# Patient Record
Sex: MALE | Race: OTHER | Employment: OTHER | URBAN - METROPOLITAN AREA
[De-identification: names, ages, dates, MRNs, and addresses within clinical notes are randomized per-mention and may not be internally consistent; named-entity substitution may affect disease eponyms.]

---

## 2021-09-20 ENCOUNTER — APPOINTMENT (OUTPATIENT)
Dept: GENERAL RADIOLOGY | Facility: HOSPITAL | Age: 71
End: 2021-09-20
Payer: MEDICARE

## 2021-09-20 ENCOUNTER — HOSPITAL ENCOUNTER (OUTPATIENT)
Facility: HOSPITAL | Age: 71
Setting detail: OBSERVATION
Discharge: HOME HEALTH | End: 2021-09-22
Attending: EMERGENCY MEDICINE | Admitting: HOSPITALIST
Payer: MEDICARE

## 2021-09-20 DIAGNOSIS — J96.01 ACUTE RESPIRATORY FAILURE WITH HYPOXIA (HCC): Primary | ICD-10-CM

## 2021-09-20 DIAGNOSIS — R50.9 FEBRILE ILLNESS: ICD-10-CM

## 2021-09-20 DIAGNOSIS — A41.9 SEPSIS WITHOUT ACUTE ORGAN DYSFUNCTION, DUE TO UNSPECIFIED ORGANISM (HCC): ICD-10-CM

## 2021-09-20 LAB
BASOPHILS # BLD AUTO: 0.01 X10(3) UL (ref 0–0.2)
BASOPHILS NFR BLD AUTO: 0.2 %
DEPRECATED RDW RBC AUTO: 49.6 FL (ref 35.1–46.3)
EOSINOPHIL # BLD AUTO: 0.04 X10(3) UL (ref 0–0.7)
EOSINOPHIL NFR BLD AUTO: 0.6 %
ERYTHROCYTE [DISTWIDTH] IN BLOOD BY AUTOMATED COUNT: 14.4 % (ref 11–15)
HCT VFR BLD AUTO: 33.7 %
HGB BLD-MCNC: 10.4 G/DL
IMM GRANULOCYTES # BLD AUTO: 0.06 X10(3) UL (ref 0–1)
IMM GRANULOCYTES NFR BLD: 0.9 %
LYMPHOCYTES # BLD AUTO: 1.18 X10(3) UL (ref 1–4)
LYMPHOCYTES NFR BLD AUTO: 17.8 %
MCH RBC QN AUTO: 29.3 PG (ref 26–34)
MCHC RBC AUTO-ENTMCNC: 30.9 G/DL (ref 31–37)
MCV RBC AUTO: 94.9 FL
MONOCYTES # BLD AUTO: 0.31 X10(3) UL (ref 0.1–1)
MONOCYTES NFR BLD AUTO: 4.7 %
NEUTROPHILS # BLD AUTO: 5.02 X10 (3) UL (ref 1.5–7.7)
NEUTROPHILS # BLD AUTO: 5.02 X10(3) UL (ref 1.5–7.7)
NEUTROPHILS NFR BLD AUTO: 75.8 %
PLATELET # BLD AUTO: 374 10(3)UL (ref 150–450)
RBC # BLD AUTO: 3.55 X10(6)UL
WBC # BLD AUTO: 6.6 X10(3) UL (ref 4–11)

## 2021-09-20 PROCEDURE — 71045 X-RAY EXAM CHEST 1 VIEW: CPT | Performed by: EMERGENCY MEDICINE

## 2021-09-20 RX ORDER — TAMSULOSIN HYDROCHLORIDE 0.4 MG/1
0.4 CAPSULE ORAL DAILY
COMMUNITY

## 2021-09-20 RX ORDER — CIPROFLOXACIN 500 MG/1
500 TABLET, FILM COATED ORAL 2 TIMES DAILY
Status: ON HOLD | COMMUNITY
End: 2021-09-22

## 2021-09-20 RX ORDER — ONDANSETRON 4 MG/1
4 TABLET, ORALLY DISINTEGRATING ORAL EVERY 6 HOURS PRN
COMMUNITY
End: 2021-09-22

## 2021-09-20 RX ORDER — CELECOXIB 200 MG/1
200 CAPSULE ORAL DAILY
COMMUNITY
End: 2021-09-22

## 2021-09-20 RX ORDER — ASPIRIN 81 MG/1
TABLET, CHEWABLE ORAL 2 TIMES DAILY
COMMUNITY
End: 2021-09-22

## 2021-09-20 RX ORDER — DOCUSATE SODIUM 100 MG/1
100 CAPSULE, LIQUID FILLED ORAL 2 TIMES DAILY PRN
COMMUNITY

## 2021-09-20 RX ORDER — IPRATROPIUM BROMIDE AND ALBUTEROL SULFATE 2.5; .5 MG/3ML; MG/3ML
3 SOLUTION RESPIRATORY (INHALATION) ONCE
Status: COMPLETED | OUTPATIENT
Start: 2021-09-20 | End: 2021-09-21

## 2021-09-21 ENCOUNTER — APPOINTMENT (OUTPATIENT)
Dept: ULTRASOUND IMAGING | Facility: HOSPITAL | Age: 71
End: 2021-09-21
Attending: HOSPITALIST
Payer: MEDICARE

## 2021-09-21 ENCOUNTER — APPOINTMENT (OUTPATIENT)
Dept: CT IMAGING | Facility: HOSPITAL | Age: 71
End: 2021-09-21
Attending: EMERGENCY MEDICINE
Payer: MEDICARE

## 2021-09-21 ENCOUNTER — APPOINTMENT (OUTPATIENT)
Dept: CV DIAGNOSTICS | Facility: HOSPITAL | Age: 71
End: 2021-09-21
Attending: HOSPITALIST
Payer: MEDICARE

## 2021-09-21 PROBLEM — J96.01 ACUTE RESPIRATORY FAILURE WITH HYPOXIA (HCC): Status: ACTIVE | Noted: 2021-09-21

## 2021-09-21 PROBLEM — A41.9 SEPSIS WITHOUT ACUTE ORGAN DYSFUNCTION, DUE TO UNSPECIFIED ORGANISM (HCC): Status: ACTIVE | Noted: 2021-09-21

## 2021-09-21 PROBLEM — R50.9 FEBRILE ILLNESS: Status: ACTIVE | Noted: 2021-09-21

## 2021-09-21 LAB
ADENOVIRUS PCR:: NOT DETECTED
ANION GAP SERPL CALC-SCNC: 9 MMOL/L (ref 0–18)
B PARAPERT DNA SPEC QL NAA+PROBE: NOT DETECTED
B PERT DNA SPEC QL NAA+PROBE: NOT DETECTED
BILIRUB UR QL: NEGATIVE
BUN BLD-MCNC: 11 MG/DL (ref 7–18)
BUN/CREAT SERPL: 8.6 (ref 10–20)
C DIFF TOX B STL QL: NEGATIVE
C PNEUM DNA SPEC QL NAA+PROBE: NOT DETECTED
CALCIUM BLD-MCNC: 8 MG/DL (ref 8.5–10.1)
CHLORIDE SERPL-SCNC: 107 MMOL/L (ref 98–112)
CLARITY UR: CLEAR
CO2 SERPL-SCNC: 23 MMOL/L (ref 21–32)
COLOR UR: YELLOW
CORONAVIRUS 229E PCR:: NOT DETECTED
CORONAVIRUS HKU1 PCR:: NOT DETECTED
CORONAVIRUS NL63 PCR:: NOT DETECTED
CORONAVIRUS OC43 PCR:: NOT DETECTED
CREAT BLD-MCNC: 1.28 MG/DL
FLUAV RNA SPEC QL NAA+PROBE: NOT DETECTED
FLUBV RNA SPEC QL NAA+PROBE: NOT DETECTED
GLUCOSE BLD-MCNC: 87 MG/DL (ref 70–99)
GLUCOSE UR-MCNC: NEGATIVE MG/DL
KETONES UR-MCNC: NEGATIVE MG/DL
LACTATE SERPL-SCNC: 1.9 MMOL/L (ref 0.4–2)
LACTATE SERPL-SCNC: 2.4 MMOL/L (ref 0.4–2)
METAPNEUMOVIRUS PCR:: NOT DETECTED
MYCOPLASMA PNEUMONIA PCR:: NOT DETECTED
NITRITE UR QL STRIP.AUTO: NEGATIVE
NT-PROBNP SERPL-MCNC: 470 PG/ML (ref ?–125)
OSMOLALITY SERPL CALC.SUM OF ELEC: 287 MOSM/KG (ref 275–295)
PARAINFLUENZA 1 PCR:: NOT DETECTED
PARAINFLUENZA 2 PCR:: NOT DETECTED
PARAINFLUENZA 3 PCR:: NOT DETECTED
PARAINFLUENZA 4 PCR:: NOT DETECTED
PH UR: 7 [PH] (ref 5–8)
POTASSIUM SERPL-SCNC: 4.8 MMOL/L (ref 3.5–5.1)
PROCALCITONIN SERPL-MCNC: 0.62 NG/ML (ref ?–0.16)
PROT UR-MCNC: NEGATIVE MG/DL
RHINOVIRUS/ENTERO PCR:: NOT DETECTED
RSV RNA SPEC QL NAA+PROBE: NOT DETECTED
SARS-COV-2 RNA NPH QL NAA+NON-PROBE: NOT DETECTED
SARS-COV-2 RNA RESP QL NAA+PROBE: NOT DETECTED
SODIUM SERPL-SCNC: 139 MMOL/L (ref 136–145)
SP GR UR STRIP: >1.03 (ref 1–1.03)
TROPONIN I SERPL-MCNC: <0.045 NG/ML (ref ?–0.04)
UROBILINOGEN UR STRIP-ACNC: <2

## 2021-09-21 PROCEDURE — 71260 CT THORAX DX C+: CPT | Performed by: EMERGENCY MEDICINE

## 2021-09-21 PROCEDURE — 93306 TTE W/DOPPLER COMPLETE: CPT | Performed by: HOSPITALIST

## 2021-09-21 PROCEDURE — 93970 EXTREMITY STUDY: CPT | Performed by: HOSPITALIST

## 2021-09-21 RX ORDER — ACETAMINOPHEN 500 MG
1000 TABLET ORAL ONCE
Status: COMPLETED | OUTPATIENT
Start: 2021-09-21 | End: 2021-09-21

## 2021-09-21 RX ORDER — ACETAMINOPHEN 325 MG/1
650 TABLET ORAL EVERY 6 HOURS PRN
Status: DISCONTINUED | OUTPATIENT
Start: 2021-09-21 | End: 2021-09-22

## 2021-09-21 RX ORDER — HYDROCODONE BITARTRATE AND ACETAMINOPHEN 5; 325 MG/1; MG/1
1 TABLET ORAL EVERY 6 HOURS PRN
COMMUNITY
End: 2021-09-22

## 2021-09-21 RX ORDER — VANCOMYCIN HYDROCHLORIDE
15 EVERY 24 HOURS
Status: DISCONTINUED | OUTPATIENT
Start: 2021-09-22 | End: 2021-09-22

## 2021-09-21 RX ORDER — VANCOMYCIN 2 GRAM/500 ML IN 0.9 % SODIUM CHLORIDE INTRAVENOUS
25 ONCE
Status: COMPLETED | OUTPATIENT
Start: 2021-09-21 | End: 2021-09-21

## 2021-09-21 RX ORDER — ENOXAPARIN SODIUM 100 MG/ML
1 INJECTION SUBCUTANEOUS 2 TIMES DAILY
Status: DISCONTINUED | OUTPATIENT
Start: 2021-09-21 | End: 2021-09-22

## 2021-09-21 RX ORDER — ACETAMINOPHEN 325 MG/1
650 TABLET ORAL EVERY 6 HOURS PRN
Status: DISCONTINUED | OUTPATIENT
Start: 2021-09-21 | End: 2021-09-21

## 2021-09-21 RX ORDER — ACETAMINOPHEN 500 MG
325 TABLET ORAL EVERY 6 HOURS PRN
COMMUNITY

## 2021-09-21 RX ORDER — MORPHINE SULFATE 2 MG/ML
2 INJECTION, SOLUTION INTRAMUSCULAR; INTRAVENOUS EVERY 6 HOURS PRN
Status: DISCONTINUED | OUTPATIENT
Start: 2021-09-21 | End: 2021-09-22

## 2021-09-21 RX ORDER — ASPIRIN 81 MG/1
81 TABLET, CHEWABLE ORAL 2 TIMES DAILY
Status: DISCONTINUED | OUTPATIENT
Start: 2021-09-21 | End: 2021-09-22

## 2021-09-21 RX ORDER — ONDANSETRON 2 MG/ML
4 INJECTION INTRAMUSCULAR; INTRAVENOUS EVERY 6 HOURS PRN
Status: DISCONTINUED | OUTPATIENT
Start: 2021-09-21 | End: 2021-09-22

## 2021-09-21 RX ORDER — TAMSULOSIN HYDROCHLORIDE 0.4 MG/1
0.4 CAPSULE ORAL DAILY
Status: DISCONTINUED | OUTPATIENT
Start: 2021-09-21 | End: 2021-09-22

## 2021-09-21 NOTE — ED QUICK NOTES
Spoke with receiving RN, Eugenie Polanco, about pts oxygenation. Pt is now on RA, saturation 96-97%.

## 2021-09-21 NOTE — ED PROVIDER NOTES
Patient Seen in: [facility] AND [facility] Emergency Department      History   Patient presents with:  Difficulty Breathing    Stated Complaint: RICO    Subjective:   HPI    52-year-old male approximately 1 week out from left total knee replacement on aspirin who Normal range of motion. Neck supple. No obvious JVD or stridor  Cardiovascular: Tachycardia, regular rhythm and intact and equal distal pulses. Pulmonary/Chest:  moderate respiratory distress and tachypnea.   Diminished expiratory greater than inspirato CBC W/ DIFFERENTIAL - Abnormal; Notable for the following components:    RBC 3.55 (*)     HGB 10.4 (*)     HCT 33.7 (*)     MCHC 30.9 (*)     RDW-SD 49.6 (*)     All other components within normal limits   TROPONIN I - Normal   RAPID SARS-COV-2 BY PCR - acute pulmonary process. Partially visualized gas distended loops of bowel in the upper abdomen. The results were faxed/finalized only at 0124 ET. If you would like to discuss this case directly please call [phone] (extension [phone]).   If you can' including time spent performing procedures.           Admission disposition: 9/21/2021  3:39 AM                                  Disposition and Plan     Clinical Impression:  Acute respiratory failure with hypoxia (HCC)  (primary encounter diagnosis)  Febr

## 2021-09-21 NOTE — ED QUICK NOTES
Orders for admission, patient is aware of plan and ready to go upstairs. Any questions, please call ED JAMES Ricardo  at extension [phone]. Type of COVID test sent: Rapid (-)  COVID Suspicion level:  Mod    Titratable drug(s) infusing: N/A  Rate: N/A    LOC at

## 2021-09-21 NOTE — PLAN OF CARE
Pt arrived to unit on RA, O2 saturation 98% on RA. Vital signs stable, afebrile, no complaints of pain. Received IV Zosyn and IV vancomycin in ED. Med rec complete, MD notified for orders.     Problem: PAIN - ADULT  Goal: Verbalizes/displays adequate comfor changes in respiratory status  - Assess for changes in mentation and behavior  - Position to facilitate oxygenation and minimize respiratory effort  - Oxygen supplementation based on oxygen saturation or ABGs  - Provide Smoking Cessation handout, if applic platelets)  INTERVENTIONS:  - Avoid intramuscular injections, enemas and rectal medication administration  - Ensure safe mobilization of patient  - Hold pressure on venipuncture sites to achieve adequate hemostasis  - Assess for signs and symptoms of inter

## 2021-09-21 NOTE — ED INITIAL ASSESSMENT (HPI)
Pt here with [facility]. Pt here with complaints of SOB. Pt went to lay in bed and began feeling SOB and called EMS. Pt 74% on RA and presented to ER with NRB at 92%. Pt had knee surgery about 1 week ago.

## 2021-09-21 NOTE — ED QUICK NOTES
Pt to CT and returned off Bipap. Pt reports no difficulty in breathing.  Verbal order received to place patient on 6L NC.

## 2021-09-21 NOTE — PAYOR COMM NOTE
--------------  ADMISSION REVIEW     Payor: [employer] MEDICARE ADV PPO  Subscriber #:  [id number]  Authorization Number: [id number]       ED Provider Notes        History   Patient presents with:  Difficulty Breathing    Stated Complaint: RICO    Subjective:   H obvious crackles  Abdominal: Soft. There is no tenderness. There is no guarding. Musculoskeletal: Moderate postoperative changes about the left lower leg and knee area. No obvious signs of infection or redness.   Dorsalis pedis pulses intact bilaterally FLU EXPAND PANEL + COVID-19 - Normal    Narrative:    CBC WITH DIFFERENTIAL WITH PLATELET   LACTIC ACID 3 HR POST POSITIVE   RAINBOW DRAW BLUE   RAINBOW DRAW GOLD   BLOOD CULTURE   BLOOD CULTURE     EKG    Rate, intervals and axes as noted on EKG Report. was in waiting room for 8 hours for urinary retention and got cervantes. Cervantes was removed after 3 days by urology but then reinserted next day for retention.  He showed up with fever to [facility] ER and was sent put with cipro po     Pmh: no medical problems  FH: require TBD          9/21 ID    # Acute fever with rigors, R/o bacteremia ? reactive to PE ?DVT               -FU blood cx  # Acute pulmonary emboli with LLE DVT  # Recent L TKA 9/8/21  # Urinary retention     PLAN:  -  Continue on vancomycin and zosyn pend Jade Dickey RCP      ipratropium-albuterol (DUONEB) nebulizer solution 3 mL     Date Action Dose Route User    9/21/2021 0000 Given  Nebulization Belen Dickey RCP      nitroGLYCERIN (NITRO-BID 2 % TD OINT) 2 % ointment 1 inch     Date Action Dose Route User

## 2021-09-21 NOTE — CONSULTS
[facility]    Cindi Garcia Patient Status:  Inpatient    1950 MRN [id number]   Location [facility]5W Attending Paige Villavicencio MD   Hosp Day # 0 PCP Harriett Fitzpatrick       Reason for [facility] BID    Review of Systems:  CONSTITUTIONAL:  No weight loss, +fever  HEENT:  Eyes:  No visual loss, blurred vision, double vision or yellow sclerae. Ears, Nose, Throat:  No hearing loss, sneezing, congestion, runny nose or sore throat.   SKIN:  No rash or it shortness of breath.  Patient was seen in [facility] ED on 9/13 with difficulty urinating s/p cervantes placement, where it was removed on 9/16, but then replaced the next day and came to [facility] on 9/19 for fever up to 101.3, UA with 44 wbcs, and sent on ciprofl

## 2021-09-21 NOTE — PROGRESS NOTES
[facility] Dosing Service    Initial Pharmacokinetic Consult for Vancomycin AUC Dosing    Lavon Sarabia is a 72year old patient who is being treated for fever of unknown origin.   Pharmacy has been asked to dose vancomycin by Brianna Pacheco PA-C.

## 2021-09-21 NOTE — PLAN OF CARE
Problem: Patient Centered Care  Goal: Patient preferences are identified and integrated in the patient's plan of care  Description: Interventions:  - What would you like us to know as we care for you?  Recent surgery - left knee replacement  - Provide lori Progressing     Problem: RISK FOR INFECTION - ADULT  Goal: Absence of fever/infection during anticipated neutropenic period  Description: INTERVENTIONS  - Monitor WBC  - Administer growth factors as ordered  - Implement neutropenic guidelines  Outcome: Pro drain sites and surrounding tissue  - Implement wound care per orders  - Initiate isolation precautions as appropriate  - Initiate Pressure Ulcer prevention bundle as indicated  Outcome: Progressing     Vital signs are stable. Denies pain or discomfort.  On

## 2021-09-21 NOTE — CM/SW NOTE
Pt is home w/spouse. [facility] has one [facility] left. Pt does not want further home health. Pt stated he will f/u with [facility]. Eliquis coupon provided to RN.     KARAN/SW to remain available for support and/or dis

## 2021-09-22 VITALS
HEIGHT: 67 IN | RESPIRATION RATE: 20 BRPM | TEMPERATURE: 98 F | SYSTOLIC BLOOD PRESSURE: 134 MMHG | OXYGEN SATURATION: 98 % | BODY MASS INDEX: 27.49 KG/M2 | HEART RATE: 89 BPM | WEIGHT: 175.19 LBS | DIASTOLIC BLOOD PRESSURE: 66 MMHG

## 2021-09-22 PROCEDURE — 99223 1ST HOSP IP/OBS HIGH 75: CPT | Performed by: INTERNAL MEDICINE

## 2021-09-22 RX ORDER — VANCOMYCIN HYDROCHLORIDE 125 MG/1
125 CAPSULE ORAL DAILY
Status: DISCONTINUED | OUTPATIENT
Start: 2021-09-22 | End: 2021-09-22

## 2021-09-22 RX ORDER — CIPROFLOXACIN 500 MG/1
500 TABLET, FILM COATED ORAL 2 TIMES DAILY
Qty: 6 TABLET | Refills: 0 | Status: SHIPPED | OUTPATIENT
Start: 2021-09-22 | End: 2021-09-25

## 2021-09-22 RX ORDER — VANCOMYCIN HYDROCHLORIDE 125 MG/1
125 CAPSULE ORAL DAILY
Qty: 10 CAPSULE | Refills: 0 | Status: SHIPPED | OUTPATIENT
Start: 2021-09-23 | End: 2021-10-03

## 2021-09-22 NOTE — PLAN OF CARE
Continues on IV Zosyn and vancomycin, awaiting blood cultures and 2D echo results. Continues on Lovenox for right PE and L leg DVT. Tylenol given once for pain. Espinoza in place and draining. Stool negative for C diff, had 1 BM this shift.  Fall risk precauti evaluate response  - Consider cultural and social influences on pain and pain management  - Manage/alleviate anxiety  - Utilize distraction and/or relaxation techniques  - Monitor for opioid side effects  - Notify MD/LIP if interventions unsuccessful or pa signs/symptoms of CO2 retention  Outcome: Progressing     Problem: SKIN/TISSUE INTEGRITY - ADULT  Goal: Skin integrity remains intact  Description: INTERVENTIONS  - Assess and document risk factors for pressure ulcer development  - Assess and document skin mobility to safest level of function  Description: INTERVENTIONS:  - Assess patient stability and activity tolerance for standing, transferring and ambulating w/ or w/o assistive devices  - Assist with transfers and ambulation using safe patient handling e

## 2021-09-22 NOTE — H&P
[facility] Patient Status:  Inpatient    1950 MRN [id number]   Location [facility]5W Attending Pietro Mead MD   Hosp Day # 0 NATACHA Mendoza     Date:  2021  Date of Admi Dispersible, Take 4 mg by mouth every 6 (six) hours as needed for Nausea. docusate sodium 100 MG Oral Cap, Take 100 mg by mouth 2 (two) times daily. ciprofloxacin 500 MG Oral Tab, Take 500 mg by mouth 2 (two) times daily.   tamsulosin (FLOMAX) cap, Take 0 1.28 09/20/2021    BUN 11 09/20/2021     09/20/2021    K 4.8 09/20/2021     09/20/2021    CO2 23.0 09/20/2021    GLU 87 09/20/2021    CA 8.0 (L) 09/20/2021    TROP <0.045 09/20/2021       US VENOUS DOPPLER LEG BILAT - DIAG IMG (CPT=93970)    Re (CST): Jesus Cuevas MD on 9/21/2021 at 9:33 AM     Finalized by (CST): Jesus Cuevas MD on 9/21/2021 at 9:47 AM          EKG    Result Date: 9/20/2021  ECG Report  Interpretation  -------------------------- Sinus Tachycardia -Short WA syndrome - occas

## 2021-09-22 NOTE — PROGRESS NOTES
INFECTIOUS DISEASE PROGRESS NOTE  [facility]    Yehuda Ross Patient Status:  Inpatient    1950 MRN [id number]   Location [facility]5W Attending Mercedes Smith MD   Hosp Day # 1 [facility] with rigors, R/o bacteremia ? reactive to PE/DVT               -Blood cx NGTD  # Acute pulmonary emboli with LLE DVT  # Recent L TKA 9/8/21  # Urinary retention     PLAN:  -  Stop vancomycin and zosyn at this time.  Can DC on PO ciprofloxacin x 3 more days w

## 2021-09-22 NOTE — CONSULTS
[facility] HOSP - [facility]    Report of Consultation    Christiana Vickerslling Patient Status:  Inpatient    1950 MRN [id number]   Location [facility]5W Attending Layla Burgos MD   Hosp Day # 1 PCP Lilo Client     Date of Admission:   Pain.  ondansetron 4 MG Oral Tablet Dispersible, Take 4 mg by mouth every 6 (six) hours as needed for Nausea. docusate sodium 100 MG Oral Cap, Take 100 mg by mouth 2 (two) times daily.   ciprofloxacin 500 MG Oral Tab, Take 500 mg by mouth 2 (two) times hunter He has no cervical adenopathy. Neurological: He is oriented to person, place, and time. No sensory deficit or motor deficit. Skin: Skin is dry.    Psychiatric: His behavior is normal.       Results:     Lab Results   Component Value Date    WBC 6.6 Radiology teleradiology service. Small right lung distal segmental and subsegmental branch pulmonary emboli were not described on the preliminary report. The patient was admitted to the hospital overnight.   Findings of right lung pulmonary emboli were di

## 2021-09-22 NOTE — PAYOR COMM NOTE
--------------  CONTINUED STAY REVIEW    [employer] MED[employer] ADV PPO  Subscriber #:  [id number]  Authorization Number: [id number]        9/22 ID    -  Stop vancomycin and zosyn at this time. Can DC on PO ciprofloxacin x 3 more days with continued OVP. tamsulosin [facility]) cap 0.4 mg     Date Action Dose Route User    9/22/2021 6830 Given  Oral RomAbdullahi wagner, RN      vancomycin (VANCOCIN) cap 125 mg     Date Action Dose Route User    9/22/2021 1004 Given  Oral Abdullahi Ragland, RN      vancomycin IVPB pr

## 2021-09-22 NOTE — DISCHARGE SUMMARY
[facility]    Discharge Summary    Ivana Zheng Patient Status:  Inpatient    1950 MRN [id number]   Location [facility]5W Attending Karo Ma MD   Hosp Day # 1 PCP Carolee Burris     Date of Admission: 2021 q12h on dc  Urinary retention: cervantes present  On admission, fups with urology for workup.  Continue tamsulosin    Complications: none    Consultants  Chat With All Active Members    Provider Role Specialty    Luis Felipe Marc MD  Consulting Physician  PULM

## 2021-09-22 NOTE — PLAN OF CARE
No complaints of pain. Discharge education provided and IV removed by this RN. Patient understands follow up instructions. All questions answered and all belongings gathered. Discharged in stable condition to home with family. Espinoza leg bag provided.    Pro management  - Manage/alleviate anxiety  - Utilize distraction and/or relaxation techniques  - Monitor for opioid side effects  - Notify MD/LIP if interventions unsuccessful or patient reports new pain  - Anticipate increased pain with activity and pre-medi Adequate for Discharge     Problem: SKIN/TISSUE INTEGRITY - ADULT  Goal: Skin integrity remains intact  Description: INTERVENTIONS  - Assess and document risk factors for pressure ulcer development  - Assess and document skin integrity  - Monitor for areas Return mobility to safest level of function  Description: INTERVENTIONS:  - Assess patient stability and activity tolerance for standing, transferring and ambulating w/ or w/o assistive devices  - Assist with transfers and ambulation using safe patient hale

## 2021-09-23 NOTE — PAYOR COMM NOTE
Discharge Notification    Patient Name: Shabbir Davis  Payor: [facility] MEDICARE ADV PPO  Subscriber #: [id number]  Authorization Number: [id number]  Admit Date/Time: 9/20/2021 11:42 PM  Discharge Date/Time: 9/22/2021 3:55 PM

## 2021-10-05 ENCOUNTER — APPOINTMENT (OUTPATIENT)
Dept: CT IMAGING | Facility: HOSPITAL | Age: 71
DRG: 698 | End: 2021-10-05
Attending: EMERGENCY MEDICINE
Payer: MEDICARE

## 2021-10-05 ENCOUNTER — HOSPITAL ENCOUNTER (INPATIENT)
Facility: HOSPITAL | Age: 71
LOS: 1 days | Discharge: HOME | DRG: 698 | End: 2021-10-07
Attending: EMERGENCY MEDICINE | Admitting: HOSPITALIST
Payer: MEDICARE

## 2021-10-05 ENCOUNTER — APPOINTMENT (OUTPATIENT)
Dept: GENERAL RADIOLOGY | Facility: HOSPITAL | Age: 71
DRG: 698 | End: 2021-10-05
Attending: EMERGENCY MEDICINE
Payer: MEDICARE

## 2021-10-05 DIAGNOSIS — R50.9 ACUTE FEBRILE ILLNESS: Primary | ICD-10-CM

## 2021-10-05 DIAGNOSIS — N30.00 ACUTE CYSTITIS WITHOUT HEMATURIA: ICD-10-CM

## 2021-10-05 PROBLEM — E87.1 HYPONATREMIA: Status: ACTIVE | Noted: 2021-10-05

## 2021-10-05 PROBLEM — D64.9 ANEMIA: Status: ACTIVE | Noted: 2021-10-05

## 2021-10-05 PROCEDURE — 87086 URINE CULTURE/COLONY COUNT: CPT | Performed by: EMERGENCY MEDICINE

## 2021-10-05 PROCEDURE — 71045 X-RAY EXAM CHEST 1 VIEW: CPT | Performed by: EMERGENCY MEDICINE

## 2021-10-05 PROCEDURE — 87186 SC STD MICRODIL/AGAR DIL: CPT | Performed by: EMERGENCY MEDICINE

## 2021-10-05 PROCEDURE — 81001 URINALYSIS AUTO W/SCOPE: CPT | Performed by: EMERGENCY MEDICINE

## 2021-10-05 PROCEDURE — 96361 HYDRATE IV INFUSION ADD-ON: CPT

## 2021-10-05 PROCEDURE — 83605 ASSAY OF LACTIC ACID: CPT | Performed by: EMERGENCY MEDICINE

## 2021-10-05 PROCEDURE — 85610 PROTHROMBIN TIME: CPT | Performed by: EMERGENCY MEDICINE

## 2021-10-05 PROCEDURE — 80048 BASIC METABOLIC PNL TOTAL CA: CPT | Performed by: EMERGENCY MEDICINE

## 2021-10-05 PROCEDURE — 74177 CT ABD & PELVIS W/CONTRAST: CPT | Performed by: EMERGENCY MEDICINE

## 2021-10-05 PROCEDURE — 36415 COLL VENOUS BLD VENIPUNCTURE: CPT

## 2021-10-05 PROCEDURE — 96365 THER/PROPH/DIAG IV INF INIT: CPT

## 2021-10-05 PROCEDURE — 85730 THROMBOPLASTIN TIME PARTIAL: CPT | Performed by: EMERGENCY MEDICINE

## 2021-10-05 PROCEDURE — 85025 COMPLETE CBC W/AUTO DIFF WBC: CPT | Performed by: EMERGENCY MEDICINE

## 2021-10-05 PROCEDURE — 87088 URINE BACTERIA CULTURE: CPT | Performed by: EMERGENCY MEDICINE

## 2021-10-05 PROCEDURE — 80076 HEPATIC FUNCTION PANEL: CPT | Performed by: EMERGENCY MEDICINE

## 2021-10-05 PROCEDURE — 99285 EMERGENCY DEPT VISIT HI MDM: CPT

## 2021-10-05 PROCEDURE — 83690 ASSAY OF LIPASE: CPT | Performed by: EMERGENCY MEDICINE

## 2021-10-05 PROCEDURE — 87040 BLOOD CULTURE FOR BACTERIA: CPT | Performed by: EMERGENCY MEDICINE

## 2021-10-06 PROCEDURE — 85018 HEMOGLOBIN: CPT | Performed by: HOSPITALIST

## 2021-10-06 PROCEDURE — 80053 COMPREHEN METABOLIC PANEL: CPT | Performed by: HOSPITALIST

## 2021-10-06 PROCEDURE — C9113 INJ PANTOPRAZOLE SODIUM, VIA: HCPCS | Performed by: HOSPITALIST

## 2021-10-06 PROCEDURE — 85014 HEMATOCRIT: CPT | Performed by: HOSPITALIST

## 2021-10-06 RX ORDER — ONDANSETRON 2 MG/ML
4 INJECTION INTRAMUSCULAR; INTRAVENOUS EVERY 6 HOURS PRN
Status: DISCONTINUED | OUTPATIENT
Start: 2021-10-06 | End: 2021-10-07

## 2021-10-06 RX ORDER — SODIUM CHLORIDE 9 MG/ML
INJECTION, SOLUTION INTRAVENOUS ONCE
Status: COMPLETED | OUTPATIENT
Start: 2021-10-06 | End: 2021-10-06

## 2021-10-06 RX ORDER — TAMSULOSIN HYDROCHLORIDE 0.4 MG/1
0.4 CAPSULE ORAL NIGHTLY
Status: DISCONTINUED | OUTPATIENT
Start: 2021-10-06 | End: 2021-10-07

## 2021-10-06 RX ORDER — ACETAMINOPHEN 500 MG
500 TABLET ORAL EVERY 6 HOURS PRN
Status: DISCONTINUED | OUTPATIENT
Start: 2021-10-06 | End: 2021-10-07

## 2021-10-06 RX ORDER — SODIUM CHLORIDE 9 MG/ML
INJECTION, SOLUTION INTRAVENOUS CONTINUOUS
Status: DISCONTINUED | OUTPATIENT
Start: 2021-10-06 | End: 2021-10-06

## 2021-10-06 RX ORDER — CIPROFLOXACIN 500 MG/1
500 TABLET, FILM COATED ORAL 2 TIMES DAILY
COMMUNITY
End: 2021-10-07

## 2021-10-06 RX ORDER — SENNA AND DOCUSATE SODIUM 50; 8.6 MG/1; MG/1
2 TABLET, FILM COATED ORAL DAILY
Status: DISCONTINUED | OUTPATIENT
Start: 2021-10-06 | End: 2021-10-07

## 2021-10-06 RX ORDER — ACETAMINOPHEN 325 MG/1
650 TABLET ORAL EVERY 6 HOURS PRN
Status: DISCONTINUED | OUTPATIENT
Start: 2021-10-06 | End: 2021-10-07

## 2021-10-06 NOTE — PAYOR COMM NOTE
--------------  ADMISSION REVIEW     Payor: [employer] MEDICARE ADV PPO  Subscriber #:  [id number]  Authorization Number: [id number]       ED Provider Notes        History   No chief complaint on file.     Stated Complaint: Fever    Subjective:   HPI    Patient p Rate and Rhythm: Normal rate and regular rhythm. Heart sounds: No murmur heard. Pulmonary:      Effort: Pulmonary effort is normal. No respiratory distress. Breath sounds: Normal breath sounds.    Abdominal:      General: There is no dis 10.00 (*)     Neutrophil Absolute 10.00 (*)     Monocyte Absolute 1.58 (*)     All other components within normal limits   LIPASE - Normal   LACTIC ACID, PLASMA - Normal   RAPID SARS-COV-2 BY PCR - Normal   CBC WITH DIFFERENTIAL WITH PLATELET    Narrative: hematuria     Disposition:  Admit  10/5/2021  9:44 pm              H&P - H&P Note      SUBJECTIVE:  Reason for Admission: UTI    History ofPresent Illness: Patient is a 72year old male with PMH of arthritis status post left total knee replacement, PE and [facility], CT CHEST PE AORTA (IV ONLY) (CPT=71260), 9/21/2021, 2:01 AM.  [facility], XR CHEST AP PORTABLE (CPT=71045), 9/20/2021, 11:52 PM.  INDICATIONS: Chest discomfort and fever of 102F.  Recently discharged for DVT & PE. Calcified splenic granuloma. ADRENALS:   No defined mass or abnormal enlargement. KIDNEYS:   Symmetric enhancement is seen without evidence of hydronephrosis or underlying solid masses. Probable bilateral renal cysts.  GI/MESENTERY:  There is no evidence Raegan Ramos MD on 10/05/2021 at 9:35 PM            ASSESSMENT:    72year old male with PMH of arthritis status post left total knee replacement, PE and DVT post surgery on Eliquis, urinary retention on Espinoza catheter presented with fever.   Patient rep male with a history of L TKA 9/8/21 at [facility] c/b urinary retention and discharged with cervantes and failed outpatient voiding trial who was just seen by our service during admission 9/20-9/22 with fever, was on ciprofloxacin for Klebsiella pneumoniae UTI P Pantoprazole Sodium (PROTONIX) 40 mg in Sodium Chloride (PF) 0.9 % 10 mL IV push     Date Action Dose Route User    10/6/2021 2514 Given  Intravenous Laci Benitez RN      Piperacillin Sod-Tazobactam So (ZOSYN) 3.375 g in dextrose 5% 100 mL IVPB-ADDV

## 2021-10-06 NOTE — ED QUICK NOTES
Orders for admission, patient is aware of plan and ready to go upstairs. Any questions please call ED RN Power Lomax at extension [phone].      Type of COVID test sent: negative  COVID Suspicion level: LOW    Titratable drugs infusing:none  Rate:    LOC at time of

## 2021-10-06 NOTE — H&P
Hospitalist History and Physical  name: Ivana Zheng  Room: [facility]9  Date of admission: 10/5/2021    Primary care provider:  Dr. Manohar Terry:  Reason for Admission: UTI    History ofPresent Illness: Patient is a 72year old male with Types: Cigarettes        Quit date:         Years since quittin.7      Smokeless tobacco: Never Used    Vaping Use      Vaping Use: Never used    Substance and Sexual Activity      Alcohol use: Not Currently      Drug use: Never      Sexual Desmond Betancourt (75.8 kg)   10/05/21 2315 114/60 — — 90 — 98 % — —   10/05/21 2300 111/72 — — 96 — 97 % — —   10/05/21 2245 118/66 — — 92 — — — —   10/05/21 2230 120/85 — — 94 — 99 % — —   10/05/21 2215 116/58 — — 97 — 98 % — —   10/05/21 2200 123/74 — — 98 — 97 % — —   1 normal.  Normal flow was demonstrated with color and pulsed Doppler. Visualized portions of the great and small saphenous, and the right posterior tibial and bilateral peroneal veins appear normal.            CONCLUSION:  1.  There is thrombus noted in 1 o today.  TECHNIQUE:   Single view. FINDINGS:  CARDIAC/VASC: Normal.  No cardiac silhouette abnormality or cardiomegaly. Unremarkable pulmonary vasculature. MEDIAST/LINA:   The aorta is slightly elongated and tortuous. No visible mass or adenopathy.  LUNG AORTA: Unremarkable configuration without aneurysm or dissection. LUNGS/PLEURA: No airspace consolidation, pleural effusion, or pneumothorax is detected. There is dependent subsegmental atelectasis bilaterally. Numerous scattered calcified granulomas. Jeermy [facility] 70 12/26/1950 H: [id number]                                E: [id number] Study date: Sep 21 2021 5:38PM               Room: [facility] Accession: [id number] HT:(67in) WT:(174.6lb)                       BP: 123 / 60 12/26/1950. Patient is 70yr old. Gender: male. BMI: 27.4kg/m^2. BSA: 1.95m^2. Patient status:  Inpatient. Study date:  Study date: 09/21/2021. Study time: 05:38 PM.  Location:  Echo laboratory.  ------------------------------------------------------------- normal in size. Pericardium:  There was no pericardial effusion. Quality of study:  Image quality was adequate. Systemic veins:  Well visualized. Inferior vena cava: The vessel was dilated.  The respirophasic diameter changes were blunted (&lt; 50%), consis index (SV/bsa), LVOT DP          37    ml/m^2   -----------   Aortic valve                            Value          Reference  Aortic valve peak velocity, S           1.51  m/sec    -----------  Aortic valve VTI, S                     30.7  cm       ----- Reference  RA area, ES, A4C                        16    cm^2     10 - 18   Right ventricle                         Value          Reference  RV ID, ED, PLAX                         3.4   cm       -----------  RV ID, minor axis, ED, A4C base         3.0 bowel obstruction. Please note that the stomach is incompletely distended and suboptimally evaluated. Redundant sigmoid colon; segments of the colon are also incompletely distended and suboptimally evaluated. Normal appendix.  URINARY BLADDER: Incomplete since last week he is having low-grade temperature and discussed with PCP who reported to go to ER if temperature is above 100. He took Tylenol with improvement in fever but it was kept getting more than 100.   Patient had urology appointment where he was

## 2021-10-06 NOTE — PLAN OF CARE
Problem: Patient Centered Care  Goal: Patient preferences are identified and integrated in the patient's plan of care  Description: Interventions:  - What would you like us to know as we care for you?  I had knee replacement in September  - Provide timely growth factors as ordered  - Implement neutropenic guidelines  Outcome: Progressing     Problem: SAFETY ADULT - FALL  Goal: Free from fall injury  Description: INTERVENTIONS:  - Assess pt frequently for physical needs  - Identify cognitive and physical def needed  - Establish a toileting routine/schedule  - Consider collaborating with pharmacy to review patient's medication profile  Outcome: Progressing  Goal: Maintains adequate nutritional intake (undernourished)  Description: INTERVENTIONS:  - Monitor perc

## 2021-10-06 NOTE — PLAN OF CARE
Patient alert and oriented x4, complains of headache treated per [facility] otherwise no acute distress. 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  Having rectal bleeding with BM, states he has been having bleeding since being pu anxiety  - Utilize distraction and/or relaxation techniques  - Monitor for opioid side effects  - Notify MD/LIP if interventions unsuccessful or patient reports new pain  - Anticipate increased pain with activity and pre-medicate as appropriate  Outcome: P related to functional status, cognitive ability or social support system  Outcome: Progressing     Problem: GASTROINTESTINAL - ADULT  Goal: Maintains or returns to baseline bowel function  Description: INTERVENTIONS:  - Assess bowel function  - Maintain ad ADULT  Goal: Maintains hematologic stability  Description: INTERVENTIONS  - Assess for signs and symptoms of bleeding or hemorrhage  - Monitor labs and vital signs for trends  - Administer supportive blood products/factors, fluids and medications as ordere

## 2021-10-06 NOTE — ED PROVIDER NOTES
Patient Seen in: [facility] 5sw/se      History   No chief complaint on file. Stated Complaint: Fever    Subjective:   HPI    Patient presents to the emergency department with fever.   He states that he has had a Espinoza catheter in for the last 3 well-developed. HENT:      Head: Normocephalic. Nose: Nose normal.      Mouth/Throat:      Mouth: Mucous membranes are moist.   Eyes:      Extraocular Movements: Extraocular movements intact.       Conjunctiva/sclera: Conjunctivae normal.      Pupils following components:    AST 51 (*)     Alkaline Phosphatase 225 (*)     Bilirubin, Direct 0.3 (*)     Albumin 2.9 (*)     All other components within normal limits   CBC W/ DIFFERENTIAL - Abnormal; Notable for the following components:    WBC 12.9 (*) infection. No CT evidence of maria pyelonephritis at this time. 2. Espinoza catheter in the urinary bladder. 3. Prostatomegaly. 4. Small fat containing umbilical and midline ventral supraumbilical hernias.  5. Grade I anterolisthesis of L5 relative to S1, rel

## 2021-10-06 NOTE — CONSULTS
[facility] - [facility] ID CONSULT NOTE    Creston Felty Patient Status:  Inpatient    1950 MRN [id number]   Location [facility] 5SW/SE Attending Pepito Ivory MD   Hosp Day # 0 PCP Areli Oliva       Reason for [facility] ANAPHYLAXIS    Medications:    Current Facility-Administered Medications:   •  influenza vaccine (PF) (FLUZONE HD) high dose for 65 yrs & older inj 0.7ml, 0.7 mL, Intramuscular, Prior to discharge  •  acetaminophen (TYLENOL) tab 650 mg, 650 mg, Oral, Q6H P SpO2 98 %. General: Awake, in bed  HEENT: Moist mucous membranes. EOMI  Neck: No lymphadenopathy. Supple. Cardiovascular: RRR  Respiratory: Lungs clear in all fields  Abdomen: Soft, no TTP  Musculoskeletal: No edema noted  Integument: No lesions.  No e cervantes in place with BPH, R/o bacteremia, pyelonephritis   -CT A/P with bladder wall thickening  # Recent PE with LLE DVT  # Rectal bleeding  # L TKA 9/8/21    PLAN:  -  Continue on zosyn day 2.  -  FU cx.  -  GI to see. -  Follow fever curve, wbc.   -  Rev

## 2021-10-07 ENCOUNTER — APPOINTMENT (OUTPATIENT)
Dept: PICC SERVICES | Facility: HOSPITAL | Age: 71
DRG: 698 | End: 2021-10-07
Attending: PHYSICIAN ASSISTANT
Payer: MEDICARE

## 2021-10-07 VITALS
SYSTOLIC BLOOD PRESSURE: 121 MMHG | HEART RATE: 95 BPM | HEIGHT: 67 IN | OXYGEN SATURATION: 98 % | TEMPERATURE: 98 F | DIASTOLIC BLOOD PRESSURE: 55 MMHG | WEIGHT: 167 LBS | RESPIRATION RATE: 16 BRPM | BODY MASS INDEX: 26.21 KG/M2

## 2021-10-07 PROBLEM — N12 PYELONEPHRITIS: Status: ACTIVE | Noted: 2021-10-07

## 2021-10-07 PROCEDURE — 05HC33Z INSERTION OF INFUSION DEVICE INTO LEFT BASILIC VEIN, PERCUTANEOUS APPROACH: ICD-10-PCS | Performed by: INTERNAL MEDICINE

## 2021-10-07 PROCEDURE — 36410 VNPNXR 3YR/> PHY/QHP DX/THER: CPT

## 2021-10-07 PROCEDURE — 85025 COMPLETE CBC W/AUTO DIFF WBC: CPT | Performed by: INTERNAL MEDICINE

## 2021-10-07 PROCEDURE — 85014 HEMATOCRIT: CPT | Performed by: HOSPITALIST

## 2021-10-07 PROCEDURE — 87493 C DIFF AMPLIFIED PROBE: CPT | Performed by: INTERNAL MEDICINE

## 2021-10-07 PROCEDURE — C9113 INJ PANTOPRAZOLE SODIUM, VIA: HCPCS | Performed by: HOSPITALIST

## 2021-10-07 PROCEDURE — 76937 US GUIDE VASCULAR ACCESS: CPT

## 2021-10-07 PROCEDURE — 85018 HEMOGLOBIN: CPT | Performed by: HOSPITALIST

## 2021-10-07 RX ORDER — LIDOCAINE HYDROCHLORIDE 10 MG/ML
5 INJECTION, SOLUTION EPIDURAL; INFILTRATION; INTRACAUDAL; PERINEURAL ONCE
Status: COMPLETED | OUTPATIENT
Start: 2021-10-07 | End: 2021-10-07

## 2021-10-07 RX ORDER — HYDROCORTISONE 25 MG/G
CREAM TOPICAL 2 TIMES DAILY
Status: DISCONTINUED | OUTPATIENT
Start: 2021-10-07 | End: 2021-10-07

## 2021-10-07 RX ORDER — ERTAPENEM 1 G/1
1 INJECTION, POWDER, LYOPHILIZED, FOR SOLUTION INTRAMUSCULAR; INTRAVENOUS DAILY
Qty: 11 EACH | Refills: 0 | Status: SHIPPED | OUTPATIENT
Start: 2021-10-07 | End: 2021-10-18

## 2021-10-07 RX ORDER — VANCOMYCIN HYDROCHLORIDE 125 MG/1
125 CAPSULE ORAL DAILY
Status: DISCONTINUED | OUTPATIENT
Start: 2021-10-07 | End: 2021-10-07

## 2021-10-07 RX ORDER — HYDROCORTISONE 25 MG/G
1 CREAM TOPICAL 2 TIMES DAILY
Qty: 1 EACH | Refills: 0 | Status: SHIPPED | OUTPATIENT
Start: 2021-10-07

## 2021-10-07 RX ORDER — POLYETHYLENE GLYCOL 3350 17 G/17G
17 POWDER, FOR SOLUTION ORAL DAILY
Status: DISCONTINUED | OUTPATIENT
Start: 2021-10-07 | End: 2021-10-07

## 2021-10-07 RX ORDER — VANCOMYCIN HYDROCHLORIDE 125 MG/1
125 CAPSULE ORAL DAILY
Qty: 17 CAPSULE | Refills: 0 | Status: SHIPPED | OUTPATIENT
Start: 2021-10-08 | End: 2021-10-25

## 2021-10-07 NOTE — PROGRESS NOTES
PROGRESS NOTE  name: Evelyn Mccann  Room: 746/106-P  Date of admission: 10/5/2021    Primary care provider:  Dr. Gracie Morocho:  Reason for Admission: UTI    History ofPresent Illness: Patient is a 72year old male with PMH of arthritis s file    Tobacco Use      Smoking status: Former Smoker        Packs/day: 2.00        Years: 15.00        Pack years: 30        Types: Cigarettes        Quit date:         Years since quittin.      Smokeless tobacco: Never Used    Vaping Use 0629 122/57 98.4 °F (36.9 °C) Oral 98 18 97 % — —   10/05/21 2359 131/64 98.7 °F (37.1 °C) Oral 105 20 99 % 5' 7\" (1.702 m) 167 lb (75.8 kg)   10/05/21 2315 114/60 — — 90 — 98 % — —   10/05/21 2300 111/72 — — 96 — 97 % — —   10/05/21 2245 118/66 — — 92 — of both lower extremities was performed in the  usual manner. FINDINGS:   There is thrombus noted in 1 of the paired left posterior tibial veins of the left calf, as well as in the left soleal vein.   The bilateral femoral and popliteal veins appear normal at 8:33 PM     Finalized by (CST): Katrina Zavala MD on 10/05/2021 at 8:35 PM          XR CHEST AP PORTABLE  (CPT=71045)    Result Date: 9/21/2021  PROCEDURE: XR CHEST AP PORTABLE  (CPT=71045) TIME: 23:52  COMPARISON: None. INDICATIONS: Dyspnea today.   Renetta Lantigua main pulmonary artery trunk is normal in caliber, measuring 2.5 cm. CARDIAC: The heart is not enlarged; mild coronary artery calcification. There is no bowing of the interventricular septum to suggest right ventricular strain.  THORACIC AORTA: Unremarkable -------------------------------------------------------------------      Transthoracic Echocardiography M-mode, complete 2D, complete spectral Doppler, and color Doppler ------------------------------------------------------------------- Duke Tejada (Definity) was administered. Study completion:  The patient tolerated the procedure well. There were no complications. Transthoracic echocardiography. M-mode, complete 2D, complete spectral Doppler, and color Doppler. Patient YOB: 1950.  Aureliano Tricuspid valve: Well visualized. Structurally normal valve. Leaflet separation was normal. Mobility was not restricted. Doppler: There was no evidence for stenosis. Mild regurgitation. Right atrium:  Well visualized. The atrium was normal in size. 1.1   cm       0.6 - 1.0   LVOT                                    Value          Reference  LVOT ID, S                              1.9   cm       -----------  Stroke volume (SV), LVOT DP             72    ml       -----------  Stroke index (SV/bsa), LVO Value          Reference  Tricuspid regurg peak velocity          3     m/sec    -----------  Tricuspid peak RV-RA gradient           35    mm Hg    -----------   Right atrium                            Value          Reference  RA area, E granuloma. ADRENALS:   No defined mass or abnormal enlargement. KIDNEYS:   Symmetric enhancement is seen without evidence of hydronephrosis or underlying solid masses. Probable bilateral renal cysts.  GI/MESENTERY:  There is no evidence of bowel obstructi 10/05/2021 at 9:35 PM            ASSESSMENT:    72year old male with PMH of arthritis status post left total knee replacement, PE and DVT post surgery on Eliquis, urinary retention on Espinoza catheter presented with fever.   Patient reported since last week

## 2021-10-07 NOTE — CM/SW NOTE
10/07/21 1400   CM/SW Referral Data   Referral Source Nurse;    Reason for Referral Discharge planning   Informant Patient; Spouse/Significant Other   Pertinent Medical Hx   Does patient have an established PCP?  Yes  Tapan Mojica

## 2021-10-07 NOTE — PROGRESS NOTES
INFECTIOUS DISEASE PROGRESS NOTE  [facility] ID PROGRESS NOTE    Maricruz Da Silva Patient Status:  Inpatient    1950 MRN [id number]   Location [facility] 5SW/SE Attending Marlys Barrientos MD   Hosp Day # 1 PCP BUBBA ciprofloxacin, who presented to [facility] ED on 10/5 with fevers. Patient states that since Saturday, he has not felt well and had fevers up to 102 at home.  Cervantes remains in place and did see urologist yesterday and cervantes bag was changed and UA sent and started

## 2021-10-07 NOTE — PAYOR COMM NOTE
--------------  CONTINUED STAY REVIEW    Dalia Parham MEDICARE ADV PPO  Subscriber #:  [id number]  Authorization Number: [id number]        10/7 INT MED       1. Hemoglobin 9.1. improved to 9.7. Baseline hemoglobin above 10. Will monitor H&H every 8 hour. Oral Didier Fields, RN      Hydrocortisone (ANUSOL-HC) suppository 25 mg     Date Action Dose Route User    10/7/2021 0805 Given  Rectal Meseret Phillips RN    10/6/2021 2300 Given  Rectal Sofie Shan Parish RN    10/6/2021 1507 Given  Rectal Lawrence Geller

## 2021-10-07 NOTE — PLAN OF CARE
Patient alert and oriented x4, complains of headache. Vital signs stable, rectal suppository given. Will start on miralax in AM per GI. Call light in reach, wife at bedside.     Problem: Patient Centered Care  Goal: Patient preferences are identified and in Progressing     Problem: RISK FOR INFECTION - ADULT  Goal: Absence of fever/infection during anticipated neutropenic period  Description: INTERVENTIONS  - Monitor WBC  - Administer growth factors as ordered  - Implement neutropenic guidelines  Outcome: Pro adequate hydration with IV or PO as ordered and tolerated  - Evaluate effectiveness of GI medications  - Encourage mobilization and activity  - Obtain nutritional consult as needed  - Establish a toileting routine/schedule  - Consider collaborating with ph appropriate  - Administer supportive blood products/factors as ordered and appropriate  Outcome: Progressing

## 2021-10-07 NOTE — CM/SW NOTE
10/07/21 1500   Discharge disposition   Expected discharge disposition Home or Self   Outpatient services Infusion center  ([facility]'s daily x's 14 days)   Discharge transportation Private car       MD discharge order entered.    Pt is all set for inf

## 2021-10-07 NOTE — PROGRESS NOTES
[facility]    Progress Note    Tashi Baptiste Patient Status:  Inpatient    1950 MRN [id number]   Location [facility] 5SW/SE Attending Allegra Sheehan MD   Hosp Day # 1 PCP Bhavani Lopez       Subjective:   NO FURTHER BLEEDI NEEDED BASED ON RISK BENEFIT EVALUATION. HOLD ANTICOAGULATION IF BLEEDING PERSISTS/WORSE  NO PLANS TO DO COLON AT PRESENT  HEM.  BANDING IN FUTURE      Results:     Lab Results   Component Value Date    WBC 6.8 10/07/2021    HGB 9.7 (L) 10/07/2021    HCT 3

## 2021-10-07 NOTE — CDS QUERY
Dr. Maribel Borrero:. To answer this query:   Click \"Edit\" button on the toolbar. 2. Type an \"X\" in the bracket for diagnosis(s) that apply. (You may also add additional clinical details as you feel necessary to substantiate your response.)  3.  Click on \"SIGN\"

## 2021-10-07 NOTE — CONSULTS
[facility]    Report of Consultation    Rene Sood Patient Status:  Inpatient    1950 MRN [id number]   Location [facility] 5SW/SE Attending Boylecollin Goldstein MD   Hosp Day # 0 PCP Etienne Hays     Date of Admission:  10/5 Rectal, BID  Senna-Docusate Sodium (SENOKOT S) 8.6-50 MG tab 2 tablet, 2 tablet, Oral, Daily      ciprofloxacin 500 MG Oral Tab, Take 500 mg by mouth 2 (two) times daily.  Take for 7 days  apixaban 5 MG Oral Tab, Take 2 tabs (10mg) by mouth twice daily for Data:  Lab Results   Component Value Date    WBC 12.9 (H) 10/05/2021    HGB 8.8 (L) 10/06/2021    HCT 28.5 (L) 10/06/2021    .0 10/05/2021    CREATSERUM 1.03 10/06/2021    BUN 9 10/06/2021     10/06/2021    K 3.7 10/06/2021     10/06/202 NEGATIVE. Recommendations:    LAXATIVE REGIMEN  AVOID CONSTIPATION  PROCTOSOL HC CREAM BID  ANTIBIOTICS PER PRIMARY MD  WIPE GENTLY  FU LABS  CONTINUE ANTICOAGULATION FOR NOW IF NEEDED BASED ON RISK BENEFIT EVALUATION.   HOLD ANTICOAGULATION IF BLEEDING

## 2021-10-08 ENCOUNTER — NURSE ONLY (OUTPATIENT)
Dept: HEMATOLOGY/ONCOLOGY | Facility: HOSPITAL | Age: 71
End: 2021-10-08
Attending: INTERNAL MEDICINE
Payer: MEDICARE

## 2021-10-08 VITALS
TEMPERATURE: 98 F | OXYGEN SATURATION: 100 % | DIASTOLIC BLOOD PRESSURE: 70 MMHG | SYSTOLIC BLOOD PRESSURE: 137 MMHG | HEART RATE: 98 BPM | RESPIRATION RATE: 16 BRPM

## 2021-10-08 DIAGNOSIS — N12 PYELONEPHRITIS: Primary | ICD-10-CM

## 2021-10-08 PROCEDURE — 96365 THER/PROPH/DIAG IV INF INIT: CPT

## 2021-10-08 RX ORDER — ERTAPENEM 1 G/1
INJECTION, POWDER, LYOPHILIZED, FOR SOLUTION INTRAMUSCULAR; INTRAVENOUS
Status: DISCONTINUED
Start: 2021-10-08 | End: 2021-10-08

## 2021-10-08 RX ORDER — 0.9 % SODIUM CHLORIDE 0.9 %
INTRAVENOUS SOLUTION INTRAVENOUS
Status: DISCONTINUED
Start: 2021-10-08 | End: 2021-10-08

## 2021-10-08 NOTE — PROGRESS NOTES
Pt here for daily abx. Ordered for pyelonephritis. This is the first infusion outpatient. This nurse reviewed plan of care; medication and potential side effects of, length of infusion, weekly labs, care of picc line, and schedule for appointments.  2 appt

## 2021-10-08 NOTE — PROGRESS NOTES
RN DISCHARGE SUMMARY: Discharge order placed. IV removed. Patient and family educated on PICC line care. Understands to follow up with pcp in 1 week. Patient aware to  vancomycin with printed prescription.  Hydrocortisone cream sent to pharmacy elect

## 2021-10-09 ENCOUNTER — NURSE ONLY (OUTPATIENT)
Dept: HEMATOLOGY/ONCOLOGY | Facility: HOSPITAL | Age: 71
End: 2021-10-09
Attending: INTERNAL MEDICINE
Payer: MEDICARE

## 2021-10-09 VITALS
SYSTOLIC BLOOD PRESSURE: 114 MMHG | DIASTOLIC BLOOD PRESSURE: 59 MMHG | OXYGEN SATURATION: 99 % | HEART RATE: 99 BPM | TEMPERATURE: 98 F | RESPIRATION RATE: 16 BRPM

## 2021-10-09 DIAGNOSIS — N12 PYELONEPHRITIS: Primary | ICD-10-CM

## 2021-10-09 PROCEDURE — 96365 THER/PROPH/DIAG IV INF INIT: CPT

## 2021-10-09 RX ORDER — ERTAPENEM 1 G/1
INJECTION, POWDER, LYOPHILIZED, FOR SOLUTION INTRAMUSCULAR; INTRAVENOUS
Status: DISCONTINUED
Start: 2021-10-09 | End: 2021-10-09

## 2021-10-09 RX ORDER — 0.9 % SODIUM CHLORIDE 0.9 %
INTRAVENOUS SOLUTION INTRAVENOUS
Status: DISCONTINUED
Start: 2021-10-09 | End: 2021-10-09

## 2021-10-09 NOTE — PROGRESS NOTES
Pt here for daily ertapenem. Ordered for pyelonephritis. Reports feeling ok overall. Urine was dark when he went to the ED: now it has cleared  Denies dysuria or pain  Enc'd fluids.   He is eating/ drinking well    To take oral vancomycin also - he did

## 2021-10-10 ENCOUNTER — NURSE ONLY (OUTPATIENT)
Dept: HEMATOLOGY/ONCOLOGY | Facility: HOSPITAL | Age: 71
End: 2021-10-10
Attending: INTERNAL MEDICINE
Payer: MEDICARE

## 2021-10-10 VITALS
SYSTOLIC BLOOD PRESSURE: 143 MMHG | TEMPERATURE: 98 F | OXYGEN SATURATION: 99 % | RESPIRATION RATE: 16 BRPM | HEART RATE: 99 BPM | DIASTOLIC BLOOD PRESSURE: 61 MMHG

## 2021-10-10 DIAGNOSIS — N12 PYELONEPHRITIS: Primary | ICD-10-CM

## 2021-10-10 PROCEDURE — 96365 THER/PROPH/DIAG IV INF INIT: CPT

## 2021-10-10 RX ORDER — ERTAPENEM 1 G/1
INJECTION, POWDER, LYOPHILIZED, FOR SOLUTION INTRAMUSCULAR; INTRAVENOUS
Status: DISPENSED
Start: 2021-10-10 | End: 2021-10-10

## 2021-10-10 RX ORDER — 0.9 % SODIUM CHLORIDE 0.9 %
INTRAVENOUS SOLUTION INTRAVENOUS
Status: DISPENSED
Start: 2021-10-10 | End: 2021-10-10

## 2021-10-10 NOTE — PROGRESS NOTES
Pt here for daily ertapenem to treat pyelonephritis. Denies flank pain, fever or chills. Reports urine appears clear yellow.   Pushing fluids well     To take oral vancomycin also - he did get this picked up yesterday for a much better cost, $42 instead of

## 2021-10-11 ENCOUNTER — NURSE ONLY (OUTPATIENT)
Dept: HEMATOLOGY/ONCOLOGY | Facility: HOSPITAL | Age: 71
End: 2021-10-11
Attending: INTERNAL MEDICINE
Payer: MEDICARE

## 2021-10-11 VITALS
OXYGEN SATURATION: 97 % | RESPIRATION RATE: 16 BRPM | TEMPERATURE: 98 F | HEART RATE: 98 BPM | SYSTOLIC BLOOD PRESSURE: 138 MMHG | DIASTOLIC BLOOD PRESSURE: 72 MMHG

## 2021-10-11 DIAGNOSIS — N12 PYELONEPHRITIS: Primary | ICD-10-CM

## 2021-10-11 PROCEDURE — 96365 THER/PROPH/DIAG IV INF INIT: CPT

## 2021-10-11 PROCEDURE — 85025 COMPLETE CBC W/AUTO DIFF WBC: CPT

## 2021-10-11 PROCEDURE — 80053 COMPREHEN METABOLIC PANEL: CPT

## 2021-10-11 RX ORDER — 0.9 % SODIUM CHLORIDE 0.9 %
INTRAVENOUS SOLUTION INTRAVENOUS
Status: DISCONTINUED
Start: 2021-10-11 | End: 2021-10-11

## 2021-10-11 RX ORDER — ERTAPENEM 1 G/1
INJECTION, POWDER, LYOPHILIZED, FOR SOLUTION INTRAMUSCULAR; INTRAVENOUS
Status: DISCONTINUED
Start: 2021-10-11 | End: 2021-10-11

## 2021-10-11 NOTE — PROGRESS NOTES
Karlene to infusion today for daily Ertapenem to treat pyelonephritis. He arrives alert and independent. Denies pain, fever or chills. Reports urine appears clear yellow. PICC flushes easily with NS and has positive blood return noted.  Weekly labs col

## 2021-10-12 ENCOUNTER — NURSE ONLY (OUTPATIENT)
Dept: HEMATOLOGY/ONCOLOGY | Facility: HOSPITAL | Age: 71
End: 2021-10-12
Attending: INTERNAL MEDICINE
Payer: MEDICARE

## 2021-10-12 VITALS
SYSTOLIC BLOOD PRESSURE: 134 MMHG | HEART RATE: 90 BPM | TEMPERATURE: 98 F | RESPIRATION RATE: 16 BRPM | DIASTOLIC BLOOD PRESSURE: 70 MMHG

## 2021-10-12 DIAGNOSIS — N12 PYELONEPHRITIS: Primary | ICD-10-CM

## 2021-10-12 PROCEDURE — 96365 THER/PROPH/DIAG IV INF INIT: CPT

## 2021-10-12 RX ORDER — ERTAPENEM 1 G/1
INJECTION, POWDER, LYOPHILIZED, FOR SOLUTION INTRAMUSCULAR; INTRAVENOUS
Status: DISPENSED
Start: 2021-10-12 | End: 2021-10-12

## 2021-10-12 RX ORDER — 0.9 % SODIUM CHLORIDE 0.9 %
INTRAVENOUS SOLUTION INTRAVENOUS
Status: DISPENSED
Start: 2021-10-12 | End: 2021-10-12

## 2021-10-12 NOTE — PROGRESS NOTES
Pt here for daily abx. Appeared to tolerate infusion, no s/s of rxn noted.   Discharged to home, ambulating independently,      Last abx day:10/18/21  F/U Dr. Atul Greer: patient called, awaiting call back***

## 2021-10-12 NOTE — DISCHARGE SUMMARY
Discharge summary  name: Nilsa Angeles  Room: 402/616-K  Date of admission: 10/5/2021  Date of discharge: 10/7/2021    Primary care provider:  Dr. Deja Hardy:  Reason for Admission: UTI    History ofPresent Illness: Patient is a 72 yea years: 30        Types: Cigarettes        Quit date: 56        Years since quittin.7      Smokeless tobacco: Never Used    Vaping Use      Vaping Use: Never used    Substance and Sexual Activity      Alcohol use: Not Currently      Drug use: Never (1.702 m) 167 lb (75.8 kg)   10/05/21 2315 114/60 — — 90 — 98 % — —   10/05/21 2300 111/72 — — 96 — 97 % — —   10/05/21 2245 118/66 — — 92 — — — —   10/05/21 2230 120/85 — — 94 — 99 % — —   10/05/21 2215 116/58 — — 97 — 98 % — —   10/05/21 2200 123/74 — — pulmonary embolism. Left knee surgery 1 week ago. TECHNIQUE: Color duplex Doppler venous ultrasound of both lower extremities was performed in the  usual manner.   FINDINGS:   There is thrombus noted in 1 of the paired left posterior tibial veins of the l radiographically evident acute intrathoracic process.    Dictated by (CST): Akshat Isaacs MD on 10/05/2021 at 8:33 PM     Finalized by (CST): Akshat Isaacs MD on 10/05/2021 at 8:35 PM          XR CHEST AP PORTABLE  (CPT=71045)    Result Date: 9/21/2021 upper and right lower lobes with subsegmental branch propagation. No large central pulmonary embolism. The main pulmonary artery trunk is normal in caliber, measuring 2.5 cm. CARDIAC: The heart is not enlarged; mild coronary artery calcification.  There i DOPPLER CONTRAST (CPT=93306)    Result Date: 9/22/2021                    [facility]* -------------------------------------------------------------------      Transthoracic Echocardiography M-mode, complete 2D, complete spectral Doppler was performed from the parasternal, apical, and subcostal acoustic windows. Intravenous contrast (Definity) was administered. Study completion:  The patient tolerated the procedure well. There were no complications. Transthoracic echocardiography.   M-mode appears to be grossly normal. Doppler: There was no evidence for stenosis. No regurgitation. Tricuspid valve: Well visualized. Structurally normal valve. Leaflet separation was normal. Mobility was not restricted. Doppler:    There was no evidence fo Ventricular septum                      Value          Reference  IVS thickness, ED                (H)    1.1   cm       0.6 - 1.0   LVOT                                    Value          Reference  LVOT ID, S                              1.9   cm       -- -----------  Mitral E/A ratio, peak                  1.2            -----------   Tricuspid valve                         Value          Reference  Tricuspid regurg peak velocity          3     m/sec    -----------  Tricuspid peak RV-RA gradient lesion, fluid collection, ductal dilatation, or atrophy. SPLEEN: No enlargement. Calcified splenic granuloma. ADRENALS:   No defined mass or abnormal enlargement.   KIDNEYS:   Symmetric enhancement is seen without evidence of hydronephrosis or underlying by (CST): Abida Orta MD on 10/05/2021 at 9:28 PM     Finalized by (CST): Abida Orta MD on 10/05/2021 at 9:35 PM            ASSESSMENT:    72year old male with PMH of arthritis status post left total knee replacement, PE and DVT post surgery on E bedside.       Prophylaxis:  DVT-Eliquis  GI-Protonix    Code Status: Full code    Vladislav Davis MD  [facility]

## 2021-10-12 NOTE — PROGRESS NOTES
Karlene to infusion today for daily Ertapenem to treat pyelonephritis. He arrives alert and independent. Denies pain, fever or chills. Reports urine appears clear yellow. PICC flushes easily with NS and has positive blood return noted.   Invanz adminis

## 2021-10-13 ENCOUNTER — NURSE ONLY (OUTPATIENT)
Dept: HEMATOLOGY/ONCOLOGY | Facility: HOSPITAL | Age: 71
End: 2021-10-13
Attending: INTERNAL MEDICINE
Payer: MEDICARE

## 2021-10-13 VITALS
SYSTOLIC BLOOD PRESSURE: 134 MMHG | DIASTOLIC BLOOD PRESSURE: 63 MMHG | TEMPERATURE: 99 F | HEART RATE: 99 BPM | OXYGEN SATURATION: 98 % | RESPIRATION RATE: 16 BRPM

## 2021-10-13 DIAGNOSIS — N12 PYELONEPHRITIS: Primary | ICD-10-CM

## 2021-10-13 PROCEDURE — 96365 THER/PROPH/DIAG IV INF INIT: CPT

## 2021-10-13 RX ORDER — 0.9 % SODIUM CHLORIDE 0.9 %
INTRAVENOUS SOLUTION INTRAVENOUS
Status: DISCONTINUED
Start: 2021-10-13 | End: 2021-10-13 | Stop reason: WASHOUT

## 2021-10-13 RX ORDER — ERTAPENEM 1 G/1
INJECTION, POWDER, LYOPHILIZED, FOR SOLUTION INTRAMUSCULAR; INTRAVENOUS
Status: DISCONTINUED
Start: 2021-10-13 | End: 2021-10-13

## 2021-10-13 RX ORDER — 0.9 % SODIUM CHLORIDE 0.9 %
INTRAVENOUS SOLUTION INTRAVENOUS
Status: DISCONTINUED
Start: 2021-10-13 | End: 2021-10-13

## 2021-10-13 NOTE — PROGRESS NOTES
Pt here for daily abx. Appeared to tolerate treatment well. No S/S of adverse rxn noted at this time. Discharged to [facility] in stable condition.     FU:  10/21  Last abx day: 10/18  Sanjuana bradford aware of gap between last da and exam. Wendy Tavera

## 2021-10-14 ENCOUNTER — NURSE ONLY (OUTPATIENT)
Dept: HEMATOLOGY/ONCOLOGY | Facility: HOSPITAL | Age: 71
End: 2021-10-14
Attending: INTERNAL MEDICINE
Payer: MEDICARE

## 2021-10-14 VITALS
OXYGEN SATURATION: 100 % | DIASTOLIC BLOOD PRESSURE: 70 MMHG | RESPIRATION RATE: 16 BRPM | HEART RATE: 93 BPM | TEMPERATURE: 98 F | SYSTOLIC BLOOD PRESSURE: 144 MMHG

## 2021-10-14 DIAGNOSIS — N12 PYELONEPHRITIS: Primary | ICD-10-CM

## 2021-10-14 PROCEDURE — 96365 THER/PROPH/DIAG IV INF INIT: CPT

## 2021-10-14 RX ORDER — ERTAPENEM 1 G/1
INJECTION, POWDER, LYOPHILIZED, FOR SOLUTION INTRAMUSCULAR; INTRAVENOUS
Status: DISCONTINUED
Start: 2021-10-14 | End: 2021-10-14

## 2021-10-14 RX ORDER — 0.9 % SODIUM CHLORIDE 0.9 %
INTRAVENOUS SOLUTION INTRAVENOUS
Status: DISCONTINUED
Start: 2021-10-14 | End: 2021-10-14

## 2021-10-14 NOTE — PROGRESS NOTES
Pt here for daily abx. Appeared to tolerate treatment well. No S/S of adverse rxn noted at this time. Discharged to [facility] in stable condition. FU:  10/21  Last abx day: 10/18  Order to pull line received.

## 2021-10-15 ENCOUNTER — NURSE ONLY (OUTPATIENT)
Dept: HEMATOLOGY/ONCOLOGY | Facility: HOSPITAL | Age: 71
End: 2021-10-15
Attending: INTERNAL MEDICINE
Payer: MEDICARE

## 2021-10-15 VITALS
HEART RATE: 89 BPM | OXYGEN SATURATION: 99 % | SYSTOLIC BLOOD PRESSURE: 129 MMHG | TEMPERATURE: 98 F | DIASTOLIC BLOOD PRESSURE: 72 MMHG | RESPIRATION RATE: 16 BRPM

## 2021-10-15 DIAGNOSIS — N12 PYELONEPHRITIS: Primary | ICD-10-CM

## 2021-10-15 PROCEDURE — 96365 THER/PROPH/DIAG IV INF INIT: CPT

## 2021-10-15 RX ORDER — 0.9 % SODIUM CHLORIDE 0.9 %
INTRAVENOUS SOLUTION INTRAVENOUS
Status: DISCONTINUED
Start: 2021-10-15 | End: 2021-10-15

## 2021-10-15 RX ORDER — ERTAPENEM 1 G/1
INJECTION, POWDER, LYOPHILIZED, FOR SOLUTION INTRAMUSCULAR; INTRAVENOUS
Status: DISCONTINUED
Start: 2021-10-15 | End: 2021-10-15

## 2021-10-15 NOTE — PROGRESS NOTES
Pt here for daily abx. PICC to left upper arm. Dressing CDI. Flushed and noted with positive blood return. Denies any current complaints. Appeared to tolerate treatment well. No S/S of adverse rxn noted at this time.   Discharged to [facility] in stable condi

## 2021-10-16 ENCOUNTER — NURSE ONLY (OUTPATIENT)
Dept: HEMATOLOGY/ONCOLOGY | Facility: HOSPITAL | Age: 71
End: 2021-10-16
Attending: INTERNAL MEDICINE
Payer: MEDICARE

## 2021-10-16 DIAGNOSIS — N12 PYELONEPHRITIS: Primary | ICD-10-CM

## 2021-10-16 PROCEDURE — 96365 THER/PROPH/DIAG IV INF INIT: CPT

## 2021-10-16 RX ORDER — ERTAPENEM 1 G/1
INJECTION, POWDER, LYOPHILIZED, FOR SOLUTION INTRAMUSCULAR; INTRAVENOUS
Status: DISCONTINUED
Start: 2021-10-16 | End: 2021-10-16

## 2021-10-16 RX ORDER — 0.9 % SODIUM CHLORIDE 0.9 %
INTRAVENOUS SOLUTION INTRAVENOUS
Status: DISCONTINUED
Start: 2021-10-16 | End: 2021-10-16

## 2021-10-16 NOTE — PROGRESS NOTES
Pt here for daily abx to treat pyelonephritis. PICC to left upper arm. Dressing CDI. Flushed and noted with positive blood return. Denies any current complaints. Appeared to tolerate treatment well. No S/S of adverse rxn noted at this time.   Discharged

## 2021-10-17 ENCOUNTER — NURSE ONLY (OUTPATIENT)
Dept: HEMATOLOGY/ONCOLOGY | Facility: HOSPITAL | Age: 71
End: 2021-10-17
Attending: INTERNAL MEDICINE
Payer: MEDICARE

## 2021-10-17 VITALS
OXYGEN SATURATION: 98 % | HEART RATE: 98 BPM | SYSTOLIC BLOOD PRESSURE: 154 MMHG | DIASTOLIC BLOOD PRESSURE: 67 MMHG | RESPIRATION RATE: 16 BRPM | TEMPERATURE: 98 F

## 2021-10-17 DIAGNOSIS — N12 PYELONEPHRITIS: Primary | ICD-10-CM

## 2021-10-17 PROCEDURE — 96365 THER/PROPH/DIAG IV INF INIT: CPT

## 2021-10-17 RX ORDER — ERTAPENEM 1 G/1
INJECTION, POWDER, LYOPHILIZED, FOR SOLUTION INTRAMUSCULAR; INTRAVENOUS
Status: DISCONTINUED
Start: 2021-10-17 | End: 2021-10-17

## 2021-10-17 RX ORDER — 0.9 % SODIUM CHLORIDE 0.9 %
INTRAVENOUS SOLUTION INTRAVENOUS
Status: DISCONTINUED
Start: 2021-10-17 | End: 2021-10-17

## 2021-10-17 NOTE — PROGRESS NOTES
Pt here for daily abx to treat pyelonephritis. He has a cervantes in place. Denies any fevers. States urine looks clear. PICC to left upper arm. Dressing CDI. Flushed and noted with positive blood return. Denies any current complaints.    Appeared to tolerate

## 2021-10-18 ENCOUNTER — NURSE ONLY (OUTPATIENT)
Dept: HEMATOLOGY/ONCOLOGY | Facility: HOSPITAL | Age: 71
End: 2021-10-18
Attending: INTERNAL MEDICINE
Payer: MEDICARE

## 2021-10-18 VITALS
SYSTOLIC BLOOD PRESSURE: 160 MMHG | TEMPERATURE: 98 F | RESPIRATION RATE: 16 BRPM | DIASTOLIC BLOOD PRESSURE: 65 MMHG | HEART RATE: 97 BPM | OXYGEN SATURATION: 99 %

## 2021-10-18 DIAGNOSIS — N12 PYELONEPHRITIS: Primary | ICD-10-CM

## 2021-10-18 PROCEDURE — 85025 COMPLETE CBC W/AUTO DIFF WBC: CPT

## 2021-10-18 PROCEDURE — 96365 THER/PROPH/DIAG IV INF INIT: CPT

## 2021-10-18 PROCEDURE — 80053 COMPREHEN METABOLIC PANEL: CPT

## 2021-10-18 PROCEDURE — 86140 C-REACTIVE PROTEIN: CPT

## 2021-10-18 PROCEDURE — 85652 RBC SED RATE AUTOMATED: CPT

## 2021-10-18 RX ORDER — ERTAPENEM 1 G/1
INJECTION, POWDER, LYOPHILIZED, FOR SOLUTION INTRAMUSCULAR; INTRAVENOUS
Status: DISCONTINUED
Start: 2021-10-18 | End: 2021-10-18

## 2021-10-18 RX ORDER — 0.9 % SODIUM CHLORIDE 0.9 %
INTRAVENOUS SOLUTION INTRAVENOUS
Status: DISCONTINUED
Start: 2021-10-18 | End: 2021-10-18

## 2021-10-18 NOTE — PROGRESS NOTES
Patient arrives for daily antibiotics for pyleonephritis. Reports he is well, denies any complaints. PICC line present to left upper arm, PICC flushed with saline, positive blood return noted.  Invanz given over thirty minutes, patient tolerated well with n

## 2024-05-08 ENCOUNTER — ANESTHESIA (OUTPATIENT)
Dept: ENDOSCOPY | Facility: HOSPITAL | Age: 74
End: 2024-05-08
Payer: MEDICARE

## 2024-05-08 ENCOUNTER — ANESTHESIA EVENT (OUTPATIENT)
Dept: ENDOSCOPY | Facility: HOSPITAL | Age: 74
End: 2024-05-08
Payer: MEDICARE

## 2024-05-08 ENCOUNTER — HOSPITAL ENCOUNTER (OUTPATIENT)
Facility: HOSPITAL | Age: 74
Setting detail: HOSPITAL OUTPATIENT SURGERY
Discharge: HOME | End: 2024-05-08
Attending: INTERNAL MEDICINE | Admitting: INTERNAL MEDICINE
Payer: MEDICARE

## 2024-05-08 VITALS
RESPIRATION RATE: 20 BRPM | HEART RATE: 85 BPM | BODY MASS INDEX: 28.25 KG/M2 | HEIGHT: 67 IN | OXYGEN SATURATION: 98 % | SYSTOLIC BLOOD PRESSURE: 123 MMHG | WEIGHT: 180 LBS | DIASTOLIC BLOOD PRESSURE: 83 MMHG

## 2024-05-08 DIAGNOSIS — D64.9 LOW HEMOGLOBIN: ICD-10-CM

## 2024-05-08 PROCEDURE — 0DB78ZX EXCISION OF STOMACH, PYLORUS, VIA NATURAL OR ARTIFICIAL OPENING ENDOSCOPIC, DIAGNOSTIC: ICD-10-PCS | Performed by: INTERNAL MEDICINE

## 2024-05-08 PROCEDURE — 88312 SPECIAL STAINS GROUP 1: CPT | Performed by: INTERNAL MEDICINE

## 2024-05-08 PROCEDURE — 88305 TISSUE EXAM BY PATHOLOGIST: CPT | Performed by: INTERNAL MEDICINE

## 2024-05-08 PROCEDURE — 0DB98ZX EXCISION OF DUODENUM, VIA NATURAL OR ARTIFICIAL OPENING ENDOSCOPIC, DIAGNOSTIC: ICD-10-PCS | Performed by: INTERNAL MEDICINE

## 2024-05-08 PROCEDURE — 0DJD8ZZ INSPECTION OF LOWER INTESTINAL TRACT, VIA NATURAL OR ARTIFICIAL OPENING ENDOSCOPIC: ICD-10-PCS | Performed by: INTERNAL MEDICINE

## 2024-05-08 RX ORDER — FINASTERIDE 5 MG/1
5 TABLET, FILM COATED ORAL DAILY
COMMUNITY
Start: 2023-07-26

## 2024-05-08 RX ORDER — SODIUM CHLORIDE, SODIUM LACTATE, POTASSIUM CHLORIDE, CALCIUM CHLORIDE 600; 310; 30; 20 MG/100ML; MG/100ML; MG/100ML; MG/100ML
INJECTION, SOLUTION INTRAVENOUS CONTINUOUS
Status: DISCONTINUED | OUTPATIENT
Start: 2024-05-08 | End: 2024-05-08

## 2024-05-08 RX ADMIN — SODIUM CHLORIDE, SODIUM LACTATE, POTASSIUM CHLORIDE, CALCIUM CHLORIDE: 600; 310; 30; 20 INJECTION, SOLUTION INTRAVENOUS at 11:21:00

## 2024-05-08 NOTE — ANESTHESIA POSTPROCEDURE EVALUATION
Patient: Karlene Lim    Procedure Summary       Date: 05/08/24 Room / Location: [facility] ENDOSCOPY 01 / [facility] ENDOSCOPY    Anesthesia Start: 1121 Anesthesia Stop: 1157    Procedures:       COLONOSCOPY      ESOPHAGOGASTRODUODENOSCOPY (EGD) Diagnosis:       Low hemoglobin      (gastritis, diverticulosis, hemorrhoids)    Surgeons: Jose Guadalupe Baer MD Anesthesiologist: Jim Prado MD    Anesthesia Type: MAC ASA Status: 2            Anesthesia Type: MAC    Vitals Value Taken Time   /84 05/08/24 1225   Temp  05/08/24 1703   Pulse 85 05/08/24 1226   Resp 16 05/08/24 1226   SpO2 98 % 05/08/24 1226   Vitals shown include unfiled device data.    [facility] AN Post Evaluation:   Patient Evaluated in PACU  Patient Participation: complete - patient participated  Level of Consciousness: awake and alert  Pain Management: adequate  Airway Patency:patent  Dental exam unchanged from preop  Yes    Nausea/Vomiting: none  Cardiovascular Status: hemodynamically stable  Respiratory Status: room air  Postoperative Hydration euvolemic      Jim Prado MD  5/8/2024 5:03 PM

## 2024-05-08 NOTE — DISCHARGE INSTRUCTIONS
Home Care Instructions for Colonoscopy and/or Gastroscopy with Sedation    Diet:  - Resume your regular diet as tolerated unless otherwise instructed.  - Start with light meals to minimize bloating.  - Do not drink alcohol today.    Medication:  - If you have questions about resuming your normal medications, please contact your Primary Care Physician.    Activities:  - Take it easy today. Do not return to work today.  - Do not drive today.  - Do not operate any machinery today (including kitchen equipment).    Colonoscopy:  - You may notice some rectal \"spotting\" (a little blood on the toilet tissue) for a day or two after the exam. This is normal.  - If you experience any rectal bleeding (not spotting), persistent tenderness or sharp severe abdominal pains, oral temperature over 100 degrees Fahrenheit, light-headedness or dizziness, or any other problems, contact your doctor.    Gastroscopy:  - You may have a sore throat for 2-3 days following the exam. This is normal. Gargling with warm salt water (1/2 tsp salt to 1 glass warm water) or using throat lozenges will help.  - If you experience any sharp pain in your neck, abdomen or chest, vomiting of blood, oral temperature over 100 degrees Fahrenheit, light-headedness or dizziness, or any other problems, contact your doctor.    **If unable to reach your doctor, please go to the [facility] Emergency Room**    - Your referring physician will receive a full report of your examination.  - If you do not hear from your doctor's office within two weeks of your biopsy, please call them for your results.    You may be able to see your laboratory results in Delivery Agent between 4 and 7 business days.  In some cases, your physician may not have viewed the results before they are released to Delivery Agent.  If you have questions regarding your results contact the physician who ordered the test/exam by phone or via Delivery Agent by choosing \"Ask a Medical Question.\"

## 2024-05-08 NOTE — ANESTHESIA PREPROCEDURE EVALUATION
Anesthesia PreOp Note    HPI:     Karlene Lim is a 73 year old male who presents for preoperative consultation requested by: Jose Guadalupe Baer MD    Date of Surgery: 5/8/2024    Procedure(s):  COLONOSCOPY / ESOPHAGOGASTRODUODENOSCOPY  ESOPHAGOGASTRODUODENOSCOPY (EGD)  Indication: LOW HEMOGLOBIN    Relevant Problems   No relevant active problems       NPO:  Last Liquid Consumption Date: 05/08/24  Last Liquid Consumption Time: 0615  Last Solid Consumption Date: 05/06/24  Last Solid Consumption Time: 1900  Last Liquid Consumption Date: 05/08/24          History Review:  Patient Active Problem List    Diagnosis Date Noted    Pyelonephritis 10/07/2021    Anemia 10/05/2021    Acute febrile illness 10/05/2021    Hyponatremia 10/05/2021    Acute cystitis without hematuria 10/05/2021    Acute respiratory failure with hypoxia (HCC) 09/21/2021    Febrile illness 09/21/2021    Sepsis without acute organ dysfunction, due to unspecified organism (HCC) 09/21/2021       Past Medical History:    DVT (deep venous thrombosis) ([facility])    Stroke ([facility])       Past Surgical History:   Procedure Laterality Date    Knee replacement surgery  09/09/2021    Total knee replacement Left 09/08/2021       Medications Prior to Admission   Medication Sig Dispense Refill Last Dose    finasteride 5 MG Oral Tab Take 1 tablet (5 mg total) by mouth daily.   5/6/2024    acetaminophen 500 MG Oral Tab Take 325 mg by mouth every 6 (six) hours as needed for Pain or Fever.       tamsulosin (FLOMAX) cap Take 1 capsule (0.4 mg total) by mouth daily.   5/6/2024    hydrocortisone 2.5 % External Cream Place 1 Tube rectally 2 (two) times daily. 1 each 0     apixaban 5 MG Oral Tab Take 2 tabs (10mg) by mouth twice daily for 7 days, then take 1 tab (5mg) by mouth twice daily thereafter. (Patient not taking: Reported on 5/2/2024) 74 tablet 0 Not Taking    docusate sodium 100 MG Oral Cap Take 100 mg by mouth 2 (two) times daily as needed for constipation.  (Patient not taking: Reported on 2024)   Not Taking     Current Facility-Administered Medications Ordered in Epic   Medication Dose Route Frequency Provider Last Rate Last Admin    lactated ringers infusion   Intravenous Continuous Jose Guadalupe Baer MD         No current [facility]-ordered outpatient medications on file.       Allergies   Allergen Reactions    Seafood HIVES    Shellfish-Derived Products ANAPHYLAXIS       History reviewed. No pertinent family history.  Social History     Socioeconomic History    Marital status:    Tobacco Use    Smoking status: Former     Current packs/day: 0.00     Average packs/day: 2.0 packs/day for 15.0 years (30.0 ttl pk-yrs)     Types: Cigarettes     Start date:      Quit date:      Years since quittin.3    Smokeless tobacco: Never   Vaping Use    Vaping status: Never Used   Substance and Sexual Activity    Alcohol use: Not Currently    Drug use: Never       Available pre-op labs reviewed.             Vital Signs:  Body mass index is 28.19 kg/m².   height is 1.702 m (5' 7\") and weight is 81.6 kg (180 lb). His blood pressure is 138/81 and his pulse is 82. His respiration is 16 and oxygen saturation is 97%.   Vitals:    24 1045 24 1025   BP:  138/81   Pulse:  82   Resp:  16   SpO2:  97%   Weight: 81.6 kg (180 lb)    Height: 1.702 m (5' 7\")         Anesthesia Evaluation      No history of anesthetic complications   Airway   Mallampati: II  TM distance: >3 FB  Neck ROM: full  Dental      Pulmonary     breath sounds clear to auscultation  (-) COPD, asthma, sleep apnea, decreased breath sounds, wheezes  Cardiovascular   Exercise tolerance: good  (-) pacemaker, hypertension, murmur    Rhythm: regular  Rate: normal    Neuro/Psych    (-) seizures    GI/Hepatic/Renal    (-) GERD    Endo/Other    (-) diabetes mellitus  Abdominal                  Anesthesia Plan:   ASA:  2  Plan:   MAC  Informed Consent Plan and Risks Discussed With:  Patient and spouse      I have  informed Karlene Lim and/or legal guardian or family member of the nature of the anesthetic plan, benefits, risks including possible dental damage if relevant, major complications, and any alternative forms of anesthetic management.   All of the patient's questions were answered to the best of my ability. The patient desires the anesthetic management as planned.  Jim Prado MD  5/8/2024 9:56 AM  Present on Admission:  **None**

## (undated) DEVICE — MEDI-VAC NON-CONDUCTIVE SUCTION TUBING 6MM X 1.8M (6FT.) L: Brand: CARDINAL HEALTH

## (undated) DEVICE — KIT ENDO ORCAPOD 160/180/190

## (undated) DEVICE — 60 ML SYRINGE REGULAR TIP: Brand: MONOJECT

## (undated) DEVICE — MEDI-VAC NON-CONDUCTIVE SUCTION TUBING: Brand: CARDINAL HEALTH

## (undated) DEVICE — GIJAW SINGLE-USE BIOPSY FORCEPS WITH NEEDLE: Brand: GIJAW

## (undated) DEVICE — Device: Brand: DUAL NARE NASAL CANNULAE FEMALE LUER CON 7FT O2 TUBE

## (undated) DEVICE — KIT CLEAN ENDOKIT 1.1OZ GOWNX2

## (undated) DEVICE — YANKAUER,BULB TIP,W/O VENT,RIGID,STERILE: Brand: MEDLINE

## (undated) DEVICE — CONMED SCOPE SAVER BITE BLOCK, 20X27 MM: Brand: SCOPE SAVER

## (undated) NOTE — LETTER
[facility]  Administration of Anesthesia  IKarlene agree to be cared for by a physician anesthesiologist alone and/or with a nurse anesthetist, who is specially trained to monitor me and give me medicine to put me to sleep or keep me comfortable during my procedure    I understand that my anesthesiologist and/or anesthetist is not an employee or agent of [facility] or [employer]. He or she works for [facility]    As the patient asking for anesthesia services, I agree to:  Allow the anesthesiologist (anesthesia doctor) to give me medicine and do additional procedures as necessary. Some examples are: Starting or using an “IV” to give me medicine, fluids or blood during my procedure, and having a breathing tube placed to help me breathe when I’m asleep (intubation). In the event that my heart stops working properly, I understand that my anesthesiologist will make every effort to sustain my life, unless otherwise directed by [facility] Do Not Resuscitate documents.  Tell my anesthesia doctor before my procedure:  If I am pregnant.  The last time that I ate or drank.  iii. All of the medicines I take (including prescriptions, herbal supplements, and pills I can buy without a prescription (including street drugs/illegal medications). Failure to inform my anesthesiologist about these medicines may increase my risk of anesthetic complications.  iv.If I am allergic to anything or have had a reaction to anesthesia before.  I understand how the anesthesia medicine will help me (benefits).  I understand that with any type of anesthesia medicine there are risks:  The most common risks are: nausea, vomiting, sore throat, muscle soreness, damage to my eyes, mouth, or teeth (from breathing tube placement).  Rare risks include: remembering what happened during my procedure, allergic reactions to medications, injury to my airway, heart, lungs, vision, nerves, or  muscles and in extremely rare instances death.  My doctor has explained to me other choices available to me for my care (alternatives).  Pregnant Patients (“epidural”):  I understand that the risks of having an epidural (medicine given into my back to help control pain during labor), include itching, low blood pressure, difficulty urinating, headache or slowing of the baby’s heart. Very rare risks include infection, bleeding, seizure, irregular heart rhythms and nerve injury.  Regional Anesthesia (“spinal”, “epidural”, & “nerve blocks”):  I understand that rare but potential complications include headache, bleeding, infection, seizure, irregular heart rhythms, and nerve injury.    _____________________________________________________________________________  Patient (or Representative) Signature/Relationship to Patient  Date   Time    _____________________________________________________________________________   Name (if used)    Language/Organization   Time    _____________________________________________________________________________  Nurse Anesthetist Signature     Date   Time  _____________________________________________________________________________  Anesthesiologist Signature     Date   Time  I have discussed the procedure and information above with the patient (or patient’s representative) and answered their questions. The patient or their representative has agreed to have anesthesia services.    _____________________________________________________________________________  Witness        Date   Time  I have verified that the signature is that of the patient or patient’s representative, and that it was signed before the procedure  Patient Name: Karlene Lim     : 1950                 Printed: 2024 at 4:07 PM    Medical Record #: [id number]                                            Page 1 of 1  ----------ANESTHESIA CONSENT----------

## (undated) NOTE — LETTER
[address], [place], [place]  Authorization for Surgical Operation and Procedure                                                                                           I hereby authorize Jose Guadalupe Baer MD, my physician and his/her assistants (if applicable), which may include medical students, residents, and/or fellows, to perform the following surgical operation/ procedure and administer such anesthesia as may be determined necessary by my physician: Operation/Procedure name (s) COLONOSCOPY / ESOPHAGOGASTRODUODENOSCOPY on [facility]   2.   I recognize that during the surgical operation/procedure, unforeseen conditions may necessitate additional or different procedures than those listed above.  I, therefore, further authorize and request that the above-named surgeon, assistants, or designees perform such procedures as are, in their judgment, necessary and desirable.    3.   My surgeon/physician has discussed prior to my surgery the potential benefits, risks and side effects of this procedure; the likelihood of achieving goals; and potential problems that might occur during recuperation.  They also discussed reasonable alternatives to the procedure, including risks, benefits, and side effects related to the alternatives and risks related to not receiving this procedure.  I have had all my questions answered and I acknowledge that no guarantee has been made as to the result that may be obtained.    4.   Should the need arise during my operation/procedure, which includes change of level of care prior to discharge, I also consent to the administration of blood and/or blood products.  Further, I understand that despite careful testing and screening of blood or blood products by collecting agencies, I may still be subject to ill effects as a result of receiving a blood transfusion and/or blood products.  The following are some, but not all, of the potential risks that can occur:  fever and allergic reactions, hemolytic reactions, transmission of diseases such as Hepatitis, AIDS and Cytomegalovirus (CMV) and fluid overload.  In the event that I wish to have an autologous transfusion of my own blood, or a directed donor transfusion, I will discuss this with my physician.  Check only if Refusing Blood or Blood Products  I understand refusal of blood or blood products as deemed necessary by my physician may have serious consequences to my condition to include possible death. I hereby assume responsibility for my refusal and release the hospital, its personnel, and my physicians from any responsibility for the consequences of my refusal.    o  Refuse   5.   I authorize the use of any specimen, organs, tissues, body parts or foreign objects that may be removed from my body during the operation/procedure for diagnosis, research or teaching purposes and their subsequent disposal by hospital authorities.  I also authorize the release of specimen test results and/or written reports to my treating physician on the hospital medical staff or other referring or consulting physicians involved in my care, at the discretion of the Pathologist or my treating physician.    6.   I consent to the photographing or videotaping of the operations or procedures to be performed, including appropriate portions of my body for medical, scientific, or educational purposes, provided my identity is not revealed by the pictures or by descriptive texts accompanying them.  If the procedure has been photographed/videotaped, the surgeon will obtain the original picture, image, videotape or CD.  The hospital will not be responsible for storage, release or maintenance of the picture, image, tape or CD.    7.   I consent to the presence of a  or observers in the operating room as deemed necessary by my physician or their designees.    8.   I recognize that in the event my procedure results in extended  X-Ray/fluoroscopy time, I may develop a skin reaction.    9. If I have a Do Not Attempt Resuscitation (DNAR) order in place, that status will be suspended while in the operating room, procedural suite, and during the recovery period unless otherwise explicitly stated by me (or a person authorized to consent on my behalf). The surgeon or my attending physician will determine when the applicable recovery period ends for purposes of reinstating the DNAR order.  10. Patients having a sterilization procedure: I understand that if the procedure is successful the results will be permanent and it will therefore be impossible for me to inseminate, conceive, or bear children.  I also understand that the procedure is intended to result in sterility, although the result has not been guaranteed.   11. I acknowledge that my physician has explained sedation/analgesia administration to me including the risk and benefits I consent to the administration of sedation/analgesia as may be necessary or desirable in the judgment of my physician.    I CERTIFY THAT I HAVE READ AND FULLY UNDERSTAND THE ABOVE CONSENT TO OPERATION and/or OTHER PROCEDURE.     _________________________________________ _________________________________     ___________________________________  Signature of Patient     Signature of Responsible Person                   Printed Name of Responsible Person                              _________________________________________ ______________________________        ___________________________________  Signature of Witness         Date  Time         Relationship to Patient    STATEMENT OF PHYSICIAN My signature below affirms that prior to the time of the procedure; I have explained to the patient and/or his/her legal representative, the risks and benefits involved in the proposed treatment and any reasonable alternative to the proposed treatment. I have also explained the risks and benefits involved in refusal of the  proposed treatment and alternatives to the proposed treatment and have answered the patient's questions. If I have a significant financial interest in a co-management agreement or a significant financial interest in any product or implant, or other significant relationship used in this procedure/surgery, I have disclosed this and had a discussion with my patient.     _______________________________________________________________ _____________________________  (Signature of Physician)                                                                                         (Date)                                   (Time)  Patient Name: Karlene Lim    : 1950   Printed: 2024      Medical Record #: [id number]                                              Page 1 of 1